# Patient Record
Sex: FEMALE | Race: OTHER | HISPANIC OR LATINO | URBAN - METROPOLITAN AREA
[De-identification: names, ages, dates, MRNs, and addresses within clinical notes are randomized per-mention and may not be internally consistent; named-entity substitution may affect disease eponyms.]

---

## 2021-09-28 ENCOUNTER — NEW PATIENT COMPREHENSIVE (OUTPATIENT)
Dept: URBAN - METROPOLITAN AREA CLINIC 49 | Facility: CLINIC | Age: 36
End: 2021-09-28

## 2021-09-28 DIAGNOSIS — H52.13: ICD-10-CM

## 2021-09-28 DIAGNOSIS — H40.013: ICD-10-CM

## 2021-09-28 PROCEDURE — 92133 CPTRZD OPH DX IMG PST SGM ON: CPT

## 2021-09-28 PROCEDURE — 92015 DETERMINE REFRACTIVE STATE: CPT

## 2021-09-28 PROCEDURE — 92004 COMPRE OPH EXAM NEW PT 1/>: CPT

## 2021-09-28 ASSESSMENT — KERATOMETRY
OD_K1POWER_DIOPTERS: 44.25
OS_K1POWER_DIOPTERS: 44.50
OS_K2POWER_DIOPTERS: 45.75
OS_AXISANGLE_DEGREES: 016
OD_AXISANGLE_DEGREES: 154
OD_AXISANGLE2_DEGREES: 64
OS_AXISANGLE2_DEGREES: 106
OD_K2POWER_DIOPTERS: 45.00

## 2021-09-28 ASSESSMENT — VISUAL ACUITY
OU_SC: 20/25
OU_CC: J1+
OU_CC: 20/20
OS_SC: 20/25
OS_CC: 20/20
OU_SC: J1+
OD_CC: 20/20
OD_SC: 20/40

## 2021-09-28 ASSESSMENT — TONOMETRY
OS_IOP_MMHG: 17
OD_IOP_MMHG: 17

## 2021-09-28 NOTE — PATIENT DISCUSSION
Recommended artificial tears to use as 3-4x a day. Start Warm Compress OU BID. Start Lid Scrubs OU BID.

## 2021-09-28 NOTE — PATIENT DISCUSSION
Patient inquired about quote for cyst removal with Dr. Luis Enrique Crowe. Referred patient to  for more information.

## 2021-09-28 NOTE — PATIENT DISCUSSION
Small cyst inferior palpebral LLL laterally. Recommend warm compresses bid OU x 2 weeks and if no improvement and it is still bothersome to patient, will schedule possible excision with Dr. Bassem Cleveland.